# Patient Record
Sex: FEMALE | Race: BLACK OR AFRICAN AMERICAN | Employment: UNEMPLOYED | URBAN - METROPOLITAN AREA
[De-identification: names, ages, dates, MRNs, and addresses within clinical notes are randomized per-mention and may not be internally consistent; named-entity substitution may affect disease eponyms.]

---
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
## 2024-03-12 ENCOUNTER — OFFICE VISIT (OUTPATIENT)
Dept: OBGYN CLINIC | Age: 19
End: 2024-03-12
Payer: MEDICAID

## 2024-03-12 VITALS
BODY MASS INDEX: 33.13 KG/M2 | SYSTOLIC BLOOD PRESSURE: 110 MMHG | DIASTOLIC BLOOD PRESSURE: 60 MMHG | HEIGHT: 63 IN | WEIGHT: 187 LBS

## 2024-03-12 DIAGNOSIS — Z01.419 WELL WOMAN EXAM: Primary | ICD-10-CM

## 2024-03-12 PROCEDURE — 99395 PREV VISIT EST AGE 18-39: CPT | Performed by: OBSTETRICS & GYNECOLOGY

## 2024-03-12 RX ORDER — NORETHINDRONE AND ETHINYL ESTRADIOL 7 DAYS X 3
1 KIT ORAL DAILY
Qty: 3 PACKET | Refills: 4 | Status: SHIPPED | OUTPATIENT
Start: 2024-03-12

## 2024-03-12 RX ORDER — ERGOCALCIFEROL 1.25 MG/1
50000 CAPSULE ORAL WEEKLY
COMMUNITY
Start: 2023-12-27

## 2024-03-12 RX ORDER — ACETAMINOPHEN AND CODEINE PHOSPHATE 120; 12 MG/5ML; MG/5ML
1 SOLUTION ORAL DAILY
COMMUNITY

## 2024-03-12 NOTE — PROGRESS NOTES
Abdoulaye  is a 18 y.o.   who is here for an annual exam.      History  Past Medical History:   Diagnosis Date    PCOS (polycystic ovarian syndrome)     ON FILE  Past Surgical History:   Procedure Laterality Date    CHOLECYSTECTOMY      PRESENT IN FILE  Current Outpatient Medications on File Prior to Visit   Medication Sig Dispense Refill    vitamin D (ERGOCALCIFEROL) 1.25 MG (96081 UT) CAPS capsule Take 1 capsule by mouth Once a week at 5 PM      norethindrone (MICRONOR) 0.35 MG tablet Take 1 tablet by mouth daily       No current facility-administered medications on file prior to visit.   SEE UPDATED LIST  No Known AllergiesSEE LIST  Social History     Tobacco Use    Smoking status: Never    Smokeless tobacco: Never   Substance Use Topics    Alcohol use: Never      No family history on file.  OBGYN History:             Physical Exam  Blood pressure 110/60, height 1.6 m (5' 3\"), weight 84.8 kg (187 lb), last menstrual period 2024. Body mass index is 33.13 kg/m².  No results found for: \"HGB\", \"HGBP\"   @LASTPROCAMB([id number])@  No results found for: \"HCGUQC\", \"PREGU\", \"HCGQR\", \"THCGA1\"    HEENT unremarkable. Sclera non-icteric.  Neck is supple without thyromegaly or nodes. Chest clear to auscultation.  Heart regular rate and rhythm with no murmur, Breast exam reveals no masses or nipple discharge.  No axillary notes are palpable. Abdomen is benign.  BUS is normal.  Cervix IF  present.  Pap smeaR performed.PRN  Bimanual exam reveals no masses.    Assessment  18 y.o.   for annual exam.  No diagnosis found.    Plan  No orders of the defined types were placed in this encounter.  Not preg  micronor to ocp  fu 1y occ rlq pain w cy no rebound  us prn decl pe   \"DEXA ordered\",AT AGE 63          \"Screening olonoscopy ordered\",IF >44YO  DUE         \"Recommend Calcium/MVI\",IF >35YRS  \"Recommend Gardisil immunization\"IF AGE 9-45     }CONTRACEPTION DISCUSSED      STD CHECK OFFERED IF

## 2024-05-01 ENCOUNTER — OFFICE VISIT (OUTPATIENT)
Dept: OBGYN CLINIC | Age: 19
End: 2024-05-01
Payer: MEDICAID

## 2024-05-01 ENCOUNTER — PROCEDURE VISIT (OUTPATIENT)
Dept: OBGYN CLINIC | Age: 19
End: 2024-05-01
Payer: MEDICAID

## 2024-05-01 VITALS — DIASTOLIC BLOOD PRESSURE: 62 MMHG | WEIGHT: 189 LBS | SYSTOLIC BLOOD PRESSURE: 100 MMHG | BODY MASS INDEX: 33.48 KG/M2

## 2024-05-01 DIAGNOSIS — N83.201 CYSTS OF BOTH OVARIES: ICD-10-CM

## 2024-05-01 DIAGNOSIS — R10.2 PELVIC PAIN: Primary | ICD-10-CM

## 2024-05-01 DIAGNOSIS — R10.2 PELVIC PAIN IN FEMALE: Primary | ICD-10-CM

## 2024-05-01 DIAGNOSIS — N83.202 CYSTS OF BOTH OVARIES: ICD-10-CM

## 2024-05-01 PROCEDURE — 76830 TRANSVAGINAL US NON-OB: CPT | Performed by: OBSTETRICS & GYNECOLOGY

## 2024-05-01 PROCEDURE — 99214 OFFICE O/P EST MOD 30 MIN: CPT | Performed by: OBSTETRICS & GYNECOLOGY

## 2024-05-01 NOTE — PROGRESS NOTES
The patient is here for  problems including pel pain    HISTORY:      Patient's last menstrual period was 03/31/2024.SEE BELOW      Current Outpatient Medications on File Prior to Visit   Medication Sig Dispense Refill    vitamin D (ERGOCALCIFEROL) 1.25 MG (18379 UT) CAPS capsule Take 1 capsule by mouth Once a week at 5 PM      norethindrone (MICRONOR) 0.35 MG tablet Take 1 tablet by mouth daily      vitamin B-2 (RIBOFLAVIN) 100 MG TABS tablet Take by mouth daily       No current facility-administered medications on file prior to visit.   SEE LIST    ROS:      PHYSICAL EXAM:  Blood pressure 100/62, weight 85.7 kg (189 lb), last menstrual period 03/31/2024.    The patient appears well, alert, oriented x 3, in no distress.  Lungs are clear. Heart RRR, no murmurs. Abdomen soft without tenderness, guarding, mass or organomegaly.  Pelvic: bg     ASSESSMENT:DIAGNOSIS DISCUSSED INCLUDING DIFFERENTIAL  p pain decl pe  no rebound    us: 38  bilat pco ocp ongoing fu 1y     PLAN:    No orders of the defined types were placed in this encounter.    Complex high risk decision making many questions answered history reviewed precharting done required 30 minute of time discussing a vaiety of problems  goals are set  follow up planned

## 2024-12-16 ENCOUNTER — OFFICE VISIT (OUTPATIENT)
Dept: OBGYN CLINIC | Age: 19
End: 2024-12-16
Payer: COMMERCIAL

## 2024-12-16 VITALS
WEIGHT: 186 LBS | DIASTOLIC BLOOD PRESSURE: 64 MMHG | SYSTOLIC BLOOD PRESSURE: 122 MMHG | HEIGHT: 63 IN | BODY MASS INDEX: 32.96 KG/M2

## 2024-12-16 DIAGNOSIS — L30.9 ACUTE DERMATITIS: Primary | ICD-10-CM

## 2024-12-16 PROCEDURE — 99214 OFFICE O/P EST MOD 30 MIN: CPT | Performed by: OBSTETRICS & GYNECOLOGY

## 2024-12-16 RX ORDER — FLUTICASONE PROPIONATE 50 MCG
SPRAY, SUSPENSION (ML) NASAL
COMMUNITY
Start: 2024-10-31

## 2024-12-16 RX ORDER — NYSTATIN AND TRIAMCINOLONE ACETONIDE 100000; 1 [USP'U]/G; MG/G
CREAM TOPICAL
Qty: 1 EACH | Refills: 2 | Status: SHIPPED | OUTPATIENT
Start: 2024-12-16

## 2024-12-16 SDOH — ECONOMIC STABILITY: FOOD INSECURITY: WITHIN THE PAST 12 MONTHS, YOU WORRIED THAT YOUR FOOD WOULD RUN OUT BEFORE YOU GOT MONEY TO BUY MORE.: SOMETIMES TRUE

## 2024-12-16 SDOH — ECONOMIC STABILITY: FOOD INSECURITY: WITHIN THE PAST 12 MONTHS, THE FOOD YOU BOUGHT JUST DIDN'T LAST AND YOU DIDN'T HAVE MONEY TO GET MORE.: SOMETIMES TRUE

## 2024-12-16 SDOH — ECONOMIC STABILITY: INCOME INSECURITY: HOW HARD IS IT FOR YOU TO PAY FOR THE VERY BASICS LIKE FOOD, HOUSING, MEDICAL CARE, AND HEATING?: VERY HARD

## 2024-12-16 NOTE — PATIENT INSTRUCTIONS
[employer]*  (Call [facility] if you need more resources.)    Meals on Wheels  They offer: Meal delivery for eligible individuals.  Contact: [phone]    [place] [employer]  They offer: Fresh food boxes. $5 for SNAP/EBT persons, $15 for all others.  Contact: [email or web address] (must preorder from site).   Helpful Info: Pickup every other Wednesday 11am-6pm at [address] [facility], [place] [address] [place] [address] Food Pantry  They offer: Groceries/food.  Contact: [phone]; [address] [place] [address]  Helpful Info: Open Thursday 8am-12pm.    [facility] Pant[facility]  They offer: Groceries/food.  Contact: [phone]; [address] [place] [address]  Helpful Info: Open 8am-5pm Monday-Thursdays, 8am-12pm Fridays.    [facility]  They offer: Groceries/food.  Contact: [phone]; [address] [place] [address]  Helpful Info: Must call for hours and availability.         [facility]  They offer: Groceries/food.  Contact: [phone]  Helpful Info: Call for hours and availability.     [facility]  They offer: Emergency food.  Contact: [phone]; [address] [place] [address]  Helpful Info: Hours are Monday, Wednesday, and Friday 9am-1pm.     [employer] [facility] Food Pantry  They offer: Groceries/food.  Contact: [phone]; [address] [place] [address]  Helpful Info: Call for hours and availability.     [facility]  They offer: Groceries/food.  Contact: [phone]; [address] [place] [address]  Helpful Info: Call for hours and availability.

## 2024-12-16 NOTE — PROGRESS NOTES
The patient is here for  problems including nipple dermititis     HISTORY:  Current Meds reviewed, Surgical and Medical history seen     Patient's last menstrual period was 11/15/2024.SEE BELOW      Current Outpatient Medications on File Prior to Visit   Medication Sig Dispense Refill    fluticasone (FLONASE) 50 MCG/ACT nasal spray       vitamin D (ERGOCALCIFEROL) 1.25 MG (22472 UT) CAPS capsule Take 1 capsule by mouth Once a week at 5 PM      norethindrone (MICRONOR) 0.35 MG tablet Take 1 tablet by mouth daily      vitamin B-2 (RIBOFLAVIN) 100 MG TABS tablet Take by mouth daily       No current facility-administered medications on file prior to visit.   SEE LIST    ROS:      PHYSICAL EXAM:  Blood pressure 122/64, height 1.6 m (5' 3\"), weight 84.4 kg (186 lb), last menstrual period 11/15/2024.    The patient appears well, alert, oriented x 3, in no distress.  Lungs are clear. Heart RRR, no murmurs. Abdomen soft without tenderness, guarding, mass or organomegaly.  Pelvic: bg     Orders placed:    Prescriptions written :    ASSESSMENT/PLAn :DIAGNOSIS DISCUSSED INCLUDING DIFFERENTIAL to mycolog//lotr af       No orders of the defined types were placed in this encounter.    Many questions answered history reviewed precharting done ,   This visit required  30   minute of time discussing a variety of problems  , individual treatment options goals are set  follow up planned Mammogram ordered if >age 38yo

## 2025-05-11 SDOH — ECONOMIC STABILITY: INCOME INSECURITY: IN THE LAST 12 MONTHS, WAS THERE A TIME WHEN YOU WERE NOT ABLE TO PAY THE MORTGAGE OR RENT ON TIME?: NO

## 2025-05-11 SDOH — ECONOMIC STABILITY: FOOD INSECURITY: WITHIN THE PAST 12 MONTHS, YOU WORRIED THAT YOUR FOOD WOULD RUN OUT BEFORE YOU GOT MONEY TO BUY MORE.: SOMETIMES TRUE

## 2025-05-11 SDOH — ECONOMIC STABILITY: FOOD INSECURITY: WITHIN THE PAST 12 MONTHS, THE FOOD YOU BOUGHT JUST DIDN'T LAST AND YOU DIDN'T HAVE MONEY TO GET MORE.: PATIENT DECLINED

## 2025-05-11 SDOH — ECONOMIC STABILITY: TRANSPORTATION INSECURITY
IN THE PAST 12 MONTHS, HAS LACK OF TRANSPORTATION KEPT YOU FROM MEETINGS, WORK, OR FROM GETTING THINGS NEEDED FOR DAILY LIVING?: NO

## 2025-05-11 SDOH — ECONOMIC STABILITY: TRANSPORTATION INSECURITY
IN THE PAST 12 MONTHS, HAS THE LACK OF TRANSPORTATION KEPT YOU FROM MEDICAL APPOINTMENTS OR FROM GETTING MEDICATIONS?: YES

## 2025-05-12 ENCOUNTER — OFFICE VISIT (OUTPATIENT)
Dept: OBGYN CLINIC | Age: 20
End: 2025-05-12
Payer: COMMERCIAL

## 2025-05-12 VITALS
DIASTOLIC BLOOD PRESSURE: 70 MMHG | HEIGHT: 63 IN | SYSTOLIC BLOOD PRESSURE: 110 MMHG | BODY MASS INDEX: 34.38 KG/M2 | WEIGHT: 194 LBS

## 2025-05-12 DIAGNOSIS — Z12.4 SCREENING FOR CERVICAL CANCER: ICD-10-CM

## 2025-05-12 DIAGNOSIS — Z01.419 WELL WOMAN EXAM: Primary | ICD-10-CM

## 2025-05-12 PROCEDURE — 99395 PREV VISIT EST AGE 18-39: CPT | Performed by: OBSTETRICS & GYNECOLOGY

## 2025-05-12 RX ORDER — ACETAMINOPHEN AND CODEINE PHOSPHATE 120; 12 MG/5ML; MG/5ML
0.35 SOLUTION ORAL DAILY
Qty: 84 TABLET | Refills: 4 | Status: SHIPPED | OUTPATIENT
Start: 2025-05-12

## 2025-05-12 NOTE — PROGRESS NOTES
Abdoulaye  is a 20 y.o.   who is here for an annual exam.      History  Past Medical History:   Diagnosis Date    GERD (gastroesophageal reflux disease)      no current medication    PCOS (polycystic ovarian syndrome)     ON FILE  Past Surgical History:   Procedure Laterality Date    CHOLECYSTECTOMY      UPPER GASTROINTESTINAL ENDOSCOPY      WISDOM TOOTH EXTRACTION      PRESENT IN FILE  Current Outpatient Medications on File Prior to Visit   Medication Sig Dispense Refill    fluticasone (FLONASE) 50 MCG/ACT nasal spray       nystatin-triamcinolone (MYCOLOG II) 157676-1.1 UNIT/GM-% cream Apply topically 2 times daily. 1 each 2    vitamin D (ERGOCALCIFEROL) 1.25 MG (43603 UT) CAPS capsule Take 1 capsule by mouth Once a week at 5 PM      vitamin B-2 (RIBOFLAVIN) 100 MG TABS tablet Take by mouth daily       No current facility-administered medications on file prior to visit.   SEE UPDATED LIST  No Known AllergiesSEE LIST  Social History     Tobacco Use    Smoking status: Never    Smokeless tobacco: Never   Substance Use Topics    Alcohol use: Never      Family History   Problem Relation Age of Onset    Diabetes Maternal Grandfather     Hypertension Maternal Grandfather     Thyroid Disease Mother     Thyroid Disease Maternal Grandmother      OBGYN History:     Medical and Surgical history reviewed         Physical Exam  Blood pressure 110/70, height 1.6 m (5' 3\"), weight 88 kg (194 lb), last menstrual period 2025. Body mass index is 34.37 kg/m².  Lab Results   Component Value Date/Time    HGB 14.8 2021 05:48 AM      @LASTPROCAMB([id number])@  No results found for: \"HCGUQC\", \"THCGA1\"    HEENT unremarkable. Sclera non-icteric.  Neck is supple without thyromegaly or nodes. Chest clear to auscultation.  Heart regular rate and rhythm with no murmur, Breast exam reveals no masses or nipple discharge.  No axillary notes are palpable. Abdomen is benign.  BUS is normal.  Cervix IF

## 2025-05-16 LAB
COLLECTION METHOD: NORMAL
CYTOLOGIST CVX/VAG CYTO: NORMAL
CYTOLOGY CVX/VAG DOC THIN PREP: NORMAL
DATE OF LMP: NORMAL
HPV REFLEX: NORMAL
Lab: NORMAL
OTHER PT INFO: NORMAL
PAP SOURCE: NORMAL
PATH REPORT.FINAL DX SPEC: NORMAL
PREV CYTO INFO: NORMAL
PREV TREATMENT: NORMAL
STAT OF ADQ CVX/VAG CYTO-IMP: NORMAL

## (undated) DEVICE — BUTTON SWITCH PENCIL BLADE ELECTRODE, HOLSTER: Brand: EDGE

## (undated) DEVICE — KIT,ANTI FOG,W/SPONGE & FLUID,SOFT PACK: Brand: MEDLINE

## (undated) DEVICE — DRAPE,LAP,CHOLE,W/TROUGHS,STERILE: Brand: MEDLINE

## (undated) DEVICE — BLOCK BITE AD 60FR W/ VELC STRP ADDRESSES MOST PT AND

## (undated) DEVICE — TROCAR: Brand: KII® SLEEVE

## (undated) DEVICE — TROCAR: Brand: KII FIOS FIRST ENTRY

## (undated) DEVICE — BLADE SURG NO15 S STL STR DISP GLASSVAN

## (undated) DEVICE — APPLIER CLP M L L11.4IN DIA10MM ENDOSCP ROT MULT FOR LIG

## (undated) DEVICE — SOLUTION IV 1000ML 0.9% SOD CHL

## (undated) DEVICE — CANNULA NSL ORAL AD FOR CAPNOFLEX CO2 O2 AIRLFE

## (undated) DEVICE — NEEDLE HYPO 21GA L1.5IN INTRAMUSCULAR S STL LATCH BVL UP

## (undated) DEVICE — TUBING INSUFFLATION SMK EVAC HI FLO SET PNEUMOCLEAR

## (undated) DEVICE — FORCEPS BX L240CM JAW DIA2.8MM L CAP W/ NDL MIC MESH TOOTH

## (undated) DEVICE — DERMABOND SKIN ADH 0.7ML -- DERMABOND ADVANCED 12/BX

## (undated) DEVICE — TROCARS: Brand: KII® BLUNT TIP ACCESS SYSTEM

## (undated) DEVICE — GENERAL LAPAROSCOPY: Brand: MEDLINE INDUSTRIES, INC.

## (undated) DEVICE — CONTAINER SPEC FRMLN 120ML --

## (undated) DEVICE — GARMENT,MEDLINE,DVT,INT,CALF,MED, GEN2: Brand: MEDLINE

## (undated) DEVICE — SYR LR LCK 1ML GRAD NSAF 30ML --

## (undated) DEVICE — APPLICATOR COT-TIP WOOD 6IN -- NON STRL

## (undated) DEVICE — COVER,LIGHT HANDLE,FLX,2/PK: Brand: MEDLINE INDUSTRIES, INC.

## (undated) DEVICE — CONTAINER PREFIL FRMLN 40ML --

## (undated) DEVICE — SUTURE MCRYL SZ 4-0 L27IN ABSRB UD L19MM PS-2 1/2 CIR PRIM Y426H

## (undated) DEVICE — SUTURE SZ 0 27IN 5/8 CIR UR-6  TAPER PT VIOLET ABSRB VICRYL J603H

## (undated) DEVICE — CONNECTOR TBNG OD5-7MM O2 END DISP

## (undated) DEVICE — SYR 10ML LUER LOK 1/5ML GRAD --

## (undated) DEVICE — PREP SKN CHLRAPRP APL 26ML STR --

## (undated) DEVICE — REM POLYHESIVE ADULT PATIENT RETURN ELECTRODE: Brand: VALLEYLAB

## (undated) DEVICE — KENDALL RADIOLUCENT FOAM MONITORING ELECTRODE RECTANGULAR SHAPE: Brand: KENDALL

## (undated) DEVICE — LOGICUT SCISSOR LENGTH 320MM: Brand: LOGI - LAPAROSCOPIC INSTRUMENT SYSTEM